# Patient Record
Sex: FEMALE | Race: WHITE | HISPANIC OR LATINO | URBAN - METROPOLITAN AREA
[De-identification: names, ages, dates, MRNs, and addresses within clinical notes are randomized per-mention and may not be internally consistent; named-entity substitution may affect disease eponyms.]

---

## 2017-05-09 ENCOUNTER — EMERGENCY (EMERGENCY)
Facility: HOSPITAL | Age: 52
LOS: 1 days | Discharge: HOME | End: 2017-05-09
Attending: EMERGENCY MEDICINE | Admitting: EMERGENCY MEDICINE
Payer: COMMERCIAL

## 2017-05-09 VITALS
OXYGEN SATURATION: 100 % | HEART RATE: 71 BPM | RESPIRATION RATE: 17 BRPM | DIASTOLIC BLOOD PRESSURE: 78 MMHG | TEMPERATURE: 98 F | SYSTOLIC BLOOD PRESSURE: 140 MMHG

## 2017-05-09 VITALS
OXYGEN SATURATION: 99 % | RESPIRATION RATE: 18 BRPM | DIASTOLIC BLOOD PRESSURE: 84 MMHG | HEART RATE: 74 BPM | SYSTOLIC BLOOD PRESSURE: 156 MMHG | TEMPERATURE: 98 F

## 2017-05-09 DIAGNOSIS — M25.562 PAIN IN LEFT KNEE: ICD-10-CM

## 2017-05-09 PROCEDURE — 73562 X-RAY EXAM OF KNEE 3: CPT

## 2017-05-09 PROCEDURE — 99283 EMERGENCY DEPT VISIT LOW MDM: CPT | Mod: 25

## 2017-05-09 PROCEDURE — 99283 EMERGENCY DEPT VISIT LOW MDM: CPT

## 2017-05-09 PROCEDURE — 73562 X-RAY EXAM OF KNEE 3: CPT | Mod: 26,LT

## 2017-05-09 NOTE — ED PROVIDER NOTE - PHYSICAL EXAMINATION
christiano - l knee med jt line ttp, small ant med effusion, no erythma from no ext flx lag, lachman neg ext mech intact

## 2017-05-09 NOTE — ED PROVIDER NOTE - OBJECTIVE STATEMENT
51 year old female w no PMHx reports that she was exercising in a [employer] class 1 week ago and has had anterior left knee pain since that time. She has been taking Motrin without improvement. She denies fall or injury to the left knee. She is able to ambulate, but she does have pain bearing weight on the left leg.

## 2017-05-09 NOTE — ED PROVIDER NOTE - CARE PLAN
Principal Discharge DX:	Acute pain of left knee Principal Discharge DX:	Acute pain of left knee  Instructions for follow-up, activity and diet:	Follow up with orthopedics in 1-2 days.  (See list attached)  Rest, Ice 3-4 x a day x 48hours, elevate knee  Keep ACE bandage in place  Take Motrin 600mg orally every 8 hours as needed for pain take with food.  Return to the ER for any persistent/worsening or new symptoms weakness, numbness or any concerning symptoms.

## 2017-05-09 NOTE — ED PROVIDER NOTE - PLAN OF CARE
Follow up with orthopedics in 1-2 days.  (See list attached)  Rest, Ice 3-4 x a day x 48hours, elevate knee  Keep ACE bandage in place  Take Motrin 600mg orally every 8 hours as needed for pain take with food.  Return to the ER for any persistent/worsening or new symptoms weakness, numbness or any concerning symptoms.

## 2017-05-09 NOTE — ED ADULT NURSE NOTE - OBJECTIVE STATEMENT
52 y/o female patient presents ambulatory to ED with complaint of left knee pain/injury. Patient states she was at [employer] 1 week ago which is when the pain began. Patient denies any injury and trauma besides the dance exercise. No obvious deformity, no bruising, no laceration or abrasion. Patient states difficulty with bearing weight on the left side. Per patient she took Motrin at home without relief. Patient denies SOB and CP, no N/V/D, afebrile in ED, no headache, no lightheadedness/dizziness, no weakness, no numbness or tingling, no abdominal pain or tenderness, no syncope, no cough, no URI.

## 2017-06-03 ENCOUNTER — EMERGENCY (EMERGENCY)
Facility: HOSPITAL | Age: 52
LOS: 1 days | Discharge: HOME | End: 2017-06-03
Attending: EMERGENCY MEDICINE | Admitting: EMERGENCY MEDICINE
Payer: COMMERCIAL

## 2017-06-03 VITALS
RESPIRATION RATE: 17 BRPM | SYSTOLIC BLOOD PRESSURE: 131 MMHG | OXYGEN SATURATION: 100 % | DIASTOLIC BLOOD PRESSURE: 67 MMHG | HEART RATE: 60 BPM

## 2017-06-03 VITALS
SYSTOLIC BLOOD PRESSURE: 184 MMHG | HEART RATE: 85 BPM | OXYGEN SATURATION: 98 % | RESPIRATION RATE: 18 BRPM | TEMPERATURE: 99 F | DIASTOLIC BLOOD PRESSURE: 79 MMHG

## 2017-06-03 DIAGNOSIS — M54.9 DORSALGIA, UNSPECIFIED: ICD-10-CM

## 2017-06-03 DIAGNOSIS — Z79.1 LONG TERM (CURRENT) USE OF NON-STEROIDAL ANTI-INFLAMMATORIES (NSAID): ICD-10-CM

## 2017-06-03 LAB
APPEARANCE UR: CLEAR — SIGNIFICANT CHANGE UP
BACTERIA # UR AUTO: ABNORMAL /HPF
BILIRUB UR-MCNC: NEGATIVE — SIGNIFICANT CHANGE UP
COLOR SPEC: SIGNIFICANT CHANGE UP
DIFF PNL FLD: ABNORMAL
GLUCOSE UR QL: NEGATIVE — SIGNIFICANT CHANGE UP
KETONES UR-MCNC: NEGATIVE — SIGNIFICANT CHANGE UP
LEUKOCYTE ESTERASE UR-ACNC: NEGATIVE — SIGNIFICANT CHANGE UP
NITRITE UR-MCNC: NEGATIVE — SIGNIFICANT CHANGE UP
PH UR: 7 — SIGNIFICANT CHANGE UP (ref 5–8)
PROT UR-MCNC: NEGATIVE — SIGNIFICANT CHANGE UP
RBC CASTS # UR COMP ASSIST: ABNORMAL /HPF (ref 0–2)
SP GR SPEC: 1.01 — LOW (ref 1.01–1.02)
UROBILINOGEN FLD QL: NEGATIVE — SIGNIFICANT CHANGE UP
WBC UR QL: SIGNIFICANT CHANGE UP /HPF (ref 0–5)

## 2017-06-03 PROCEDURE — 99283 EMERGENCY DEPT VISIT LOW MDM: CPT

## 2017-06-03 PROCEDURE — 81001 URINALYSIS AUTO W/SCOPE: CPT

## 2017-06-03 RX ORDER — OXYCODONE HYDROCHLORIDE 5 MG/1
5 TABLET ORAL ONCE
Qty: 0 | Refills: 0 | Status: DISCONTINUED | OUTPATIENT
Start: 2017-06-03 | End: 2017-06-03

## 2017-06-03 RX ORDER — OXYCODONE HYDROCHLORIDE 5 MG/1
2 TABLET ORAL
Qty: 24 | Refills: 0 | OUTPATIENT
Start: 2017-06-03 | End: 2017-06-06

## 2017-06-03 RX ORDER — IBUPROFEN 200 MG
0 TABLET ORAL
Qty: 0 | Refills: 0 | COMMUNITY

## 2017-06-03 RX ORDER — ACETAMINOPHEN 500 MG
975 TABLET ORAL ONCE
Qty: 0 | Refills: 0 | Status: COMPLETED | OUTPATIENT
Start: 2017-06-03 | End: 2017-06-03

## 2017-06-03 RX ADMIN — OXYCODONE HYDROCHLORIDE 5 MILLIGRAM(S): 5 TABLET ORAL at 10:29

## 2017-06-03 RX ADMIN — Medication 975 MILLIGRAM(S): at 10:29

## 2017-06-03 RX ADMIN — Medication 975 MILLIGRAM(S): at 10:54

## 2017-06-03 RX ADMIN — OXYCODONE HYDROCHLORIDE 5 MILLIGRAM(S): 5 TABLET ORAL at 10:58

## 2017-06-03 RX ADMIN — OXYCODONE HYDROCHLORIDE 5 MILLIGRAM(S): 5 TABLET ORAL at 11:29

## 2017-06-03 RX ADMIN — OXYCODONE HYDROCHLORIDE 5 MILLIGRAM(S): 5 TABLET ORAL at 10:54

## 2017-06-03 NOTE — ED ADULT NURSE NOTE - OBJECTIVE STATEMENT
52 y/o female patient presents to ED with complaint of sudden onset left lower back pain with radiation down the left leg. Patient states she was moving heavy boxes yesterday but pain started while standing up this morning. Patient has full ROM in all extremities, no bowel or bladder dysfunction, no saddle paresthesia, no skin rash. Patient took 4 Advil prior to arrival in ED without relief. 50 y/o female patient presents to ED with complaint of sudden onset left lower back pain with radiation down the left leg. Patient states she was moving heavy boxes yesterday but pain started while standing up this morning. Patient has full ROM in all extremities, no bowel or bladder dysfunction, no saddle paresthesia, no skin rash. Patient took 4 Advil prior to arrival in ED without relief. Pain worsens with movement, feels better when sitting up. Patient denies SOB and CP, no N/V/D, afebrile in ED, no headache, no lightheadedness/dizziness, no weakness, no numbness or tingling, no abdominal pain or tenderness. Patient states she got her period yesterday.

## 2017-06-03 NOTE — ED PROVIDER NOTE - PHYSICAL EXAMINATION
(GENERAL) This is a well developed, well nourished patient who is in no apparent distress.  (HEENT) There is no signs of trauma of the head, neck, chest, or belly. The head is normocephalic.  The outer ears appears normal. (EYES) The sclera is anicteric, conjunctiva is pink, mucous membranes are moist.  (Respiratory)There is no stridor and the patient is moving air throughout the respiratory tract well.  There are no visible masses of the neck.  (CARDIAC) The rate appears to be normal. There is no JVD. (SKIN) The skin is warm and dry. (MUSCULOSKELETAL) The extremities are warm to the touch with good capillary refill and without edema. No straight leg raise pain, mild pain on palpation of back, no bony tenderness. Upon my exam, the extremity is neurovascular intact with good capillary refill. No signs of trauma (GI) Abdomen is soft and nontender.  (PSYCH) The patient is cooperative and pleasant.  (NEURO) There are no obvious focal deformities on neurologic examination. Anxious, tearful, texting phone during my exam and interview.

## 2017-06-03 NOTE — ED ADULT NURSE REASSESSMENT NOTE - NS ED NURSE REASSESS COMMENT FT1
Patient tolerated PO, ambulated with steady gait. Patient offered pain medication (oxycodone 5mg) refused medication. MD Zelaya aware, patient to be discharged home.

## 2017-06-03 NOTE — ED ADULT NURSE REASSESSMENT NOTE - NS ED NURSE REASSESS COMMENT FT1
Patient ambulated to the bathroom, steady gait without difficulty. Pain still present but controlled.

## 2017-06-05 ENCOUNTER — APPOINTMENT (OUTPATIENT)
Dept: ORTHOPEDIC SURGERY | Facility: CLINIC | Age: 52
End: 2017-06-05

## 2017-06-05 VITALS
SYSTOLIC BLOOD PRESSURE: 148 MMHG | HEIGHT: 64 IN | WEIGHT: 140 LBS | DIASTOLIC BLOOD PRESSURE: 88 MMHG | HEART RATE: 71 BPM | BODY MASS INDEX: 23.9 KG/M2

## 2017-06-05 DIAGNOSIS — M47.817 SPONDYLOSIS W/OUT MYELOPATHY OR RADICULOPATHY, LUMBOSACRAL REGION: ICD-10-CM

## 2017-06-05 DIAGNOSIS — Z78.9 OTHER SPECIFIED HEALTH STATUS: ICD-10-CM

## 2017-06-05 DIAGNOSIS — Z87.39 PERSONAL HISTORY OF OTHER DISEASES OF THE MUSCULOSKELETAL SYSTEM AND CONNECTIVE TISSUE: ICD-10-CM

## 2017-06-05 PROBLEM — Z00.00 ENCOUNTER FOR PREVENTIVE HEALTH EXAMINATION: Status: ACTIVE | Noted: 2017-06-05

## 2017-06-05 RX ORDER — CYCLOBENZAPRINE HYDROCHLORIDE 10 MG/1
10 TABLET, FILM COATED ORAL
Qty: 60 | Refills: 0 | Status: ACTIVE | COMMUNITY
Start: 2017-06-05 | End: 1900-01-01

## 2017-10-03 ENCOUNTER — RESULT REVIEW (OUTPATIENT)
Age: 52
End: 2017-10-03

## 2018-07-26 PROBLEM — M47.817 LUMBOSACRAL SPONDYLOSIS: Status: ACTIVE | Noted: 2017-06-05

## 2019-04-12 ENCOUNTER — EMERGENCY (EMERGENCY)
Facility: HOSPITAL | Age: 54
LOS: 1 days | Discharge: HOME | End: 2019-04-12
Attending: EMERGENCY MEDICINE
Payer: COMMERCIAL

## 2019-04-12 VITALS
HEART RATE: 77 BPM | WEIGHT: 145.06 LBS | HEIGHT: 64 IN | TEMPERATURE: 98 F | SYSTOLIC BLOOD PRESSURE: 121 MMHG | OXYGEN SATURATION: 98 % | DIASTOLIC BLOOD PRESSURE: 79 MMHG | RESPIRATION RATE: 17 BRPM

## 2019-04-12 PROCEDURE — 99283 EMERGENCY DEPT VISIT LOW MDM: CPT

## 2019-04-12 RX ORDER — OFLOXACIN 0.3 %
1 DROPS OPHTHALMIC (EYE) ONCE
Qty: 0 | Refills: 0 | Status: COMPLETED | OUTPATIENT
Start: 2019-04-12 | End: 2019-04-12

## 2019-04-12 RX ORDER — IBUPROFEN 200 MG
600 TABLET ORAL ONCE
Qty: 0 | Refills: 0 | Status: COMPLETED | OUTPATIENT
Start: 2019-04-12 | End: 2019-04-12

## 2019-04-13 PROBLEM — M54.9 DORSALGIA, UNSPECIFIED: Chronic | Status: ACTIVE | Noted: 2017-06-03

## 2019-04-13 PROBLEM — M50.20 OTHER CERVICAL DISC DISPLACEMENT, UNSPECIFIED CERVICAL REGION: Chronic | Status: ACTIVE | Noted: 2017-06-03

## 2019-04-13 RX ADMIN — Medication 1 DROP(S): at 00:13

## 2019-04-13 NOTE — ED ADULT NURSE NOTE - NSIMPLEMENTINTERV_GEN_ALL_ED
Implemented All Universal Safety Interventions:  [place] to call system. Call bell, personal items and telephone within reach. Instruct patient to call for assistance. Room bathroom lighting operational. Non-slip footwear when patient is off stretcher. Physically safe environment: no spills, clutter or unnecessary equipment. Stretcher in lowest position, wheels locked, appropriate side rails in place.

## 2019-04-13 NOTE — ED PROVIDER NOTE - ATTENDING CONTRIBUTION TO CARE
53F, presents with R eye irritation, discharge, erythema x 1 day. 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 No contact lens use. No vision changes. No photosensitivity. No pain with EOM. No f/c, headache, dizziness, n/v/d, or any other complaints.    PE: NAD, NCAT, MMM, Trachea midline, EOMI, +R eye conjunctival injeciton with yellowish discharge, no abnormality upon on eversion of lids, no proptosis, lungs CTAB, S1/S2 RRR, Normal perfusion, 2+ radial pulses bilat, Abdomen Soft, NTND, No rebound/guarding.    Exam and hx c/w R eye conjunctivitis. Acuity intact. To give abx drops, have f/u with ophthalmology. Return precautions discussed. - Roni Young MD

## 2019-04-13 NOTE — ED PROVIDER NOTE - OBJECTIVE STATEMENT
54yo female pt, ambulatory c/o I have conjunctivitis. Pt felt right eye irritation with sticky discharge for 1day. 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 Denies blurry vision or wearing contact lenses. Denies fever, chills or cough/ congestion. Denies sesnory changes or weakness to extremities. Denies CP/SOB/ABD pain or N/V/D.

## 2019-04-13 NOTE — ED PROVIDER NOTE - NSFOLLOWUPINSTRUCTIONS_ED_ALL_ED_FT
Do not rub your eyes.  Motrin (Advil or Ibuprofen) 600mg every 8hours for pain with food.  Ofloxacin eye drop, 1 drop, to right eye, every 6hours.  Follow up with your eye dr. or clinic [phone], call Monday for an appointment in 2days.  Return for any concerns or worsening symptoms.

## 2019-04-13 NOTE — ED PROVIDER NOTE - NSFOLLOWUPCLINICS_GEN_ALL_ED_FT
[facility]  Ophthalmology  [address] [place] [address] [address]  [place] [address]  Phone: [phone]  Fax:   Follow Up Time:

## 2019-05-19 NOTE — ED PROVIDER NOTE - NS ED ROS FT
Denies fever, trauma, nausea, vomiting, and diarrhea, urinary difficulties, dysuria, pregnancy Declined

## 2019-06-20 ENCOUNTER — RESULT REVIEW (OUTPATIENT)
Age: 54
End: 2019-06-20

## 2020-04-17 NOTE — ED ADULT TRIAGE NOTE - NS ED NOTE AC HIGH RISK COUNTRIES
No
[TextBox_4] : Some discomfort posterior right upper subscapular area.\par Otherwise symptom complex without change.

## 2020-06-22 ENCOUNTER — RESULT REVIEW (OUTPATIENT)
Age: 55
End: 2020-06-22

## 2021-06-29 ENCOUNTER — RESULT REVIEW (OUTPATIENT)
Age: 56
End: 2021-06-29

## 2022-01-29 NOTE — ED ADULT NURSE NOTE - CINV DISCH MEDS REVIEWED YN
Clinic GYN visit     Subjective: Pam Winslow presents today for evaluation of vaginal bleeding. S/p RALTH+BS on 21. Seen  for VB from  (3-4 pads/day) - thought to be due to raw granulation tissue. Silver nitrate applied. She stopped bleeding at that point. Had some discharge since.     Called on-call last night - yesterday again felt a gush and soaked through her underwear. Had dime-sized clot in menstrual pad. ~50% saturated in a few hours. +Dizzy, which is new for her. No fevers/chills. Some mild cramping (more than previously but overall mild and not as bad as shortly postop). +Urinary frequency. No change in BM. No N/V. No provoking event, no tampon use or intercourse.    Since her phonecall yesterday, bleeding slowed down to spotting. Has some right-sided cramping - has always been that way. Dizziness unchanged. No new sx.     Objective:  Visit Vitals  BP 92/59 (BP Location: RUE - Right upper extremity, Patient Position: Standing, Cuff Size: Regular)   Pulse 82   Temp 98.8 °F (37.1 °C) (Temporal)   Resp 16   Wt 75 kg   LMP 2021 (Exact Date)   SpO2 97%   BMI 33.40 kg/m²   Sittin/59  Supine: 91/60    General:  alert, in NAD  HEENT:  EOMI, no icterus  Lungs:   breathing comfortably on RA  Cardiac:   RRR, no pedal edema  Abdomen:  soft, non-distended, well-healed laparoscopic incisions, tender to palpation in RLQ, no rebound or guarding  Pelvic:  Normal appearing external genitalia w/o visible lesions. Scant blood in vault. Cuff appears to be intact and palpates intact. Vaginal mucosa pink and well-rugated. Uterus and cervix surgically absent. No masses identified on bimanual exam. TTP in right adnexa (with vaginal hand) and suprapubically (with abdominal hand). Declined chaperone.     Assessment/Plan: Pam Winslow is a 39 year old  here for intermittent VB s/p RALTH+BS on 21.   Indication: pelvic pain, menorrhagia.   Pathology: adenomyosis, endocervicitis.   Op note  reviewed: findings - \"Enlarged, globular uterus; omental adhesion to the superior abdominal wall which was clear of operative field; left ovary with 2x3cm firm cyst consistent with a fibroma; right ovary appeared normal; normal-appearing bilateral fallopian tubes; normal bowel.\" Cuff closed with 0 Vicryl V-Loc.    Hemodynamically stable. Hb 13.6 today (up from 12.8 preop). Vitals WNL, neg orthostatics.       Unclear source of bleeding based on hx and exam. Ddx includes continued bleeding from granulation tissue (previously cauterized 1/21) vs bleeding due to suture breakdown vs hematoma above vaginal cuff. Monsel's applied to cuff today. U/S ordered to evaluate - unable to do today (Sat) as pt did not want to go to [facility]; scheduled for Monday 2/1.     Also unclear etiology for RLQ pain. Benign exam. No apparent etiology in op note such as extensive dissection or adhesions, though did have omental adhesion to superior abd wall. May be related to postop healing.    Follow up for U/S on Monday. Will update pt's surgeon Dr Quiroz.  We discussed indications for eval via ED - worsening dizziness, soaking a pad in an hour, worsening pain, fevers, etc.     Vannesa Kruse MD   Yes

## 2022-08-31 ENCOUNTER — RESULT REVIEW (OUTPATIENT)
Age: 57
End: 2022-08-31

## 2024-12-24 ENCOUNTER — EMERGENCY (EMERGENCY)
Facility: HOSPITAL | Age: 59
LOS: 1 days | Discharge: HOME | End: 2024-12-24
Attending: EMERGENCY MEDICINE
Payer: COMMERCIAL

## 2024-12-24 VITALS
RESPIRATION RATE: 18 BRPM | SYSTOLIC BLOOD PRESSURE: 167 MMHG | WEIGHT: 149.91 LBS | HEART RATE: 75 BPM | TEMPERATURE: 98 F | HEIGHT: 64 IN | OXYGEN SATURATION: 99 % | DIASTOLIC BLOOD PRESSURE: 79 MMHG

## 2024-12-24 LAB
ADD ON TEST-SPECIMEN IN LAB: SIGNIFICANT CHANGE UP
ALBUMIN SERPL ELPH-MCNC: 4.8 G/DL — SIGNIFICANT CHANGE UP (ref 3.3–5)
ALP SERPL-CCNC: 112 U/L — SIGNIFICANT CHANGE UP (ref 40–120)
ALT FLD-CCNC: 28 U/L — SIGNIFICANT CHANGE UP (ref 10–45)
ANION GAP SERPL CALC-SCNC: 15 MMOL/L — SIGNIFICANT CHANGE UP (ref 5–17)
AST SERPL-CCNC: 20 U/L — SIGNIFICANT CHANGE UP (ref 10–40)
BASOPHILS # BLD AUTO: 0.01 K/UL — SIGNIFICANT CHANGE UP (ref 0–0.2)
BASOPHILS NFR BLD AUTO: 0.1 % — SIGNIFICANT CHANGE UP (ref 0–2)
BILIRUB SERPL-MCNC: 0.5 MG/DL — SIGNIFICANT CHANGE UP (ref 0.2–1.2)
BUN SERPL-MCNC: 11 MG/DL — SIGNIFICANT CHANGE UP (ref 7–23)
CALCIUM SERPL-MCNC: 10 MG/DL — SIGNIFICANT CHANGE UP (ref 8.4–10.5)
CHLORIDE SERPL-SCNC: 99 MMOL/L — SIGNIFICANT CHANGE UP (ref 96–108)
CO2 SERPL-SCNC: 23 MMOL/L — SIGNIFICANT CHANGE UP (ref 22–31)
CREAT SERPL-MCNC: 0.56 MG/DL — SIGNIFICANT CHANGE UP (ref 0.5–1.3)
EGFR: 104 ML/MIN/1.73M2 — SIGNIFICANT CHANGE UP
EOSINOPHIL # BLD AUTO: 0.01 K/UL — SIGNIFICANT CHANGE UP (ref 0–0.5)
EOSINOPHIL NFR BLD AUTO: 0.1 % — SIGNIFICANT CHANGE UP (ref 0–6)
GLUCOSE SERPL-MCNC: 149 MG/DL — HIGH (ref 70–99)
HCT VFR BLD CALC: 40.7 % — SIGNIFICANT CHANGE UP (ref 34.5–45)
HGB BLD-MCNC: 13.6 G/DL — SIGNIFICANT CHANGE UP (ref 11.5–15.5)
IMM GRANULOCYTES NFR BLD AUTO: 0.4 % — SIGNIFICANT CHANGE UP (ref 0–0.9)
LIDOCAIN IGE QN: 48 U/L — SIGNIFICANT CHANGE UP (ref 7–60)
LYMPHOCYTES # BLD AUTO: 1.45 K/UL — SIGNIFICANT CHANGE UP (ref 1–3.3)
LYMPHOCYTES # BLD AUTO: 12.9 % — LOW (ref 13–44)
MAGNESIUM SERPL-MCNC: 2.1 MG/DL — SIGNIFICANT CHANGE UP (ref 1.6–2.6)
MCHC RBC-ENTMCNC: 31.2 PG — SIGNIFICANT CHANGE UP (ref 27–34)
MCHC RBC-ENTMCNC: 33.4 G/DL — SIGNIFICANT CHANGE UP (ref 32–36)
MCV RBC AUTO: 93.3 FL — SIGNIFICANT CHANGE UP (ref 80–100)
MONOCYTES # BLD AUTO: 0.88 K/UL — SIGNIFICANT CHANGE UP (ref 0–0.9)
MONOCYTES NFR BLD AUTO: 7.8 % — SIGNIFICANT CHANGE UP (ref 2–14)
NEUTROPHILS # BLD AUTO: 8.88 K/UL — HIGH (ref 1.8–7.4)
NEUTROPHILS NFR BLD AUTO: 78.7 % — HIGH (ref 43–77)
NRBC # BLD: 0 /100 WBCS — SIGNIFICANT CHANGE UP (ref 0–0)
PHOSPHATE SERPL-MCNC: 2.9 MG/DL — SIGNIFICANT CHANGE UP (ref 2.5–4.5)
PLATELET # BLD AUTO: 286 K/UL — SIGNIFICANT CHANGE UP (ref 150–400)
POTASSIUM SERPL-MCNC: 4.1 MMOL/L — SIGNIFICANT CHANGE UP (ref 3.5–5.3)
POTASSIUM SERPL-SCNC: 4.1 MMOL/L — SIGNIFICANT CHANGE UP (ref 3.5–5.3)
PROT SERPL-MCNC: 8 G/DL — SIGNIFICANT CHANGE UP (ref 6–8.3)
RBC # BLD: 4.36 M/UL — SIGNIFICANT CHANGE UP (ref 3.8–5.2)
RBC # FLD: 12.7 % — SIGNIFICANT CHANGE UP (ref 10.3–14.5)
SODIUM SERPL-SCNC: 137 MMOL/L — SIGNIFICANT CHANGE UP (ref 135–145)
WBC # BLD: 11.27 K/UL — HIGH (ref 3.8–10.5)
WBC # FLD AUTO: 11.27 K/UL — HIGH (ref 3.8–10.5)

## 2024-12-24 PROCEDURE — 83735 ASSAY OF MAGNESIUM: CPT

## 2024-12-24 PROCEDURE — 74177 CT ABD & PELVIS W/CONTRAST: CPT | Mod: 26,MC

## 2024-12-24 PROCEDURE — 84132 ASSAY OF SERUM POTASSIUM: CPT

## 2024-12-24 PROCEDURE — 99284 EMERGENCY DEPT VISIT MOD MDM: CPT | Mod: 25

## 2024-12-24 PROCEDURE — 80053 COMPREHEN METABOLIC PANEL: CPT

## 2024-12-24 PROCEDURE — 96375 TX/PRO/DX INJ NEW DRUG ADDON: CPT

## 2024-12-24 PROCEDURE — 85025 COMPLETE CBC W/AUTO DIFF WBC: CPT

## 2024-12-24 PROCEDURE — 82803 BLOOD GASES ANY COMBINATION: CPT

## 2024-12-24 PROCEDURE — 96376 TX/PRO/DX INJ SAME DRUG ADON: CPT

## 2024-12-24 PROCEDURE — 99285 EMERGENCY DEPT VISIT HI MDM: CPT

## 2024-12-24 PROCEDURE — 83690 ASSAY OF LIPASE: CPT

## 2024-12-24 PROCEDURE — 85014 HEMATOCRIT: CPT

## 2024-12-24 PROCEDURE — 82435 ASSAY OF BLOOD CHLORIDE: CPT

## 2024-12-24 PROCEDURE — 74177 CT ABD & PELVIS W/CONTRAST: CPT | Mod: MC

## 2024-12-24 PROCEDURE — 82330 ASSAY OF CALCIUM: CPT

## 2024-12-24 PROCEDURE — 96374 THER/PROPH/DIAG INJ IV PUSH: CPT | Mod: XU

## 2024-12-24 PROCEDURE — 82947 ASSAY GLUCOSE BLOOD QUANT: CPT

## 2024-12-24 PROCEDURE — 83605 ASSAY OF LACTIC ACID: CPT

## 2024-12-24 PROCEDURE — 85018 HEMOGLOBIN: CPT

## 2024-12-24 PROCEDURE — 84295 ASSAY OF SERUM SODIUM: CPT

## 2024-12-24 PROCEDURE — 84100 ASSAY OF PHOSPHORUS: CPT

## 2024-12-24 PROCEDURE — 36415 COLL VENOUS BLD VENIPUNCTURE: CPT

## 2024-12-24 RX ORDER — ONDANSETRON HYDROCHLORIDE 4 MG/1
4 TABLET, FILM COATED ORAL ONCE
Refills: 0 | Status: COMPLETED | OUTPATIENT
Start: 2024-12-24 | End: 2024-12-24

## 2024-12-24 RX ORDER — ACETAMINOPHEN 500MG 500 MG/1
1000 TABLET, COATED ORAL ONCE
Refills: 0 | Status: COMPLETED | OUTPATIENT
Start: 2024-12-24 | End: 2024-12-24

## 2024-12-24 RX ORDER — SODIUM CHLORIDE 9 MG/ML
1000 INJECTION, SOLUTION INTRAMUSCULAR; INTRAVENOUS; SUBCUTANEOUS ONCE
Refills: 0 | Status: COMPLETED | OUTPATIENT
Start: 2024-12-24 | End: 2024-12-24

## 2024-12-24 RX ADMIN — ACETAMINOPHEN 500MG 400 MILLIGRAM(S): 500 TABLET, COATED ORAL at 13:20

## 2024-12-24 RX ADMIN — ACETAMINOPHEN 500MG 400 MILLIGRAM(S): 500 TABLET, COATED ORAL at 22:03

## 2024-12-24 RX ADMIN — SODIUM CHLORIDE 1000 MILLILITER(S): 9 INJECTION, SOLUTION INTRAMUSCULAR; INTRAVENOUS; SUBCUTANEOUS at 13:22

## 2024-12-24 RX ADMIN — ONDANSETRON HYDROCHLORIDE 4 MILLIGRAM(S): 4 TABLET, FILM COATED ORAL at 13:20

## 2024-12-24 RX ADMIN — ACETAMINOPHEN 500MG 1000 MILLIGRAM(S): 500 TABLET, COATED ORAL at 19:03

## 2024-12-24 NOTE — ED ADULT TRIAGE NOTE - MEANS OF ARRIVAL
Surgery put on hold as he did not have a pre op appointment. Daughter Fela will reach out to PCP to schedule and call us back to reschedule surgery.    ambulatory

## 2024-12-24 NOTE — ED PROVIDER NOTE - NSFOLLOWUPINSTRUCTIONS_ED_ALL_ED_FT
Thank you for coming to the Emergency Department.    You were seen today for diarrhea and abdominal pain. We obtained lab work and imaging to help determine what was causing your symptoms.     Your CT demonstrates CT demonstrates infectious vs inflammatory colitis. We likely think your colitis is due to an infectious process meaning you have a stomach bug.    Based on our examination we have determined that there is no immediate danger to your health at this time.    We would like you to follow up with your primary care doctor and GI doctor within 1 week.     We will have our discharge center call you within 1-3 days to help you coordinate an appointment with a GI doctor.     PLEASE RETURN TO THE EMERGENCY DEPARTMENT and call 911 IF YOU EXPERIENCE ANY OF THE FOLLOWING SYMPTOMS: fever, worsening bleeding, palpitations, shortness of breath, light headedness or any other concerning symptoms.

## 2024-12-24 NOTE — ED ADULT NURSE NOTE - OBJECTIVE STATEMENT
59F aaox4 ambulatory came as walk in from home with c/o abdominal pain and diarrhea. 59F aaox4 ambulatory came as walk in from home with c/o abdominal pain and diarrhea started yesterday after eating fish. patient denies anyone with same complain at home. Patient also reports she's having blood in stool secondary to her hemorrhoids with the multiple diarrheas. Patient also c/o vomiting.   Left AC 18g IV access inserted, labs sent pending results.

## 2024-12-24 NOTE — ED ADULT NURSE NOTE - NSFALLHARMRISKINTERV_ED_ALL_ED

## 2024-12-24 NOTE — ED PROVIDER NOTE - OBJECTIVE STATEMENT
59-year-old female past medical history of GERD presents to ED with vomiting and diarrhea started 2 days.  Reports that she has been having this persistent abdominal pain in the left > right lower quadrant.  Patient reports that she has history of hemorrhoids but recently she has been having diarrhea with clots of blood.  Denies any fever, chest pain, shortness of breath, urinary symptoms.

## 2024-12-24 NOTE — ED PROVIDER NOTE - PATIENT PORTAL LINK FT
You can access the FollowMyHealth Patient Portal offered by [facility] by registering at the following website: [email or web address]. By joining [facility]’s FollowMyHealth portal, you will also be able to view your health information using other applications (apps) compatible with our system.

## 2024-12-24 NOTE — ED ADULT TRIAGE NOTE - AS TEMP SITE
Medication(s) to Refill:   Requested Prescriptions     Pending Prescriptions Disp Refills   • Omeprazole 40 MG Oral Capsule Delayed Release 90 capsule 0     Sig: Take 1 capsule (40 mg total) by mouth once daily.    • atorvastatin 20 MG Oral Tab 90 tablet 0 oral

## 2024-12-24 NOTE — ED PROVIDER NOTE - PHYSICAL EXAMINATION
Kyra Patel DO (PGY1)   Physical Exam:    Gen: NAD, AOx3  Head: NCAT  HEENT: EOMI, PEERLA  Lung: CTAB, no respiratory distress, no wheezes/rhonchi/rales B/L  CV: RRR, no murmurs, rubs or gallops  Abd: soft, NT, ND, no guarding, no rigidity, no rebound tenderness, mild tenderness on the LLQ  MSK: no visible deformities, ROM normal in UE/LE, no back pain  Neuro: No focal sensory or motor deficits. Sensation intact to light touch all extremities.  Skin: Warm, well perfused, no rash, no leg swelling  Psych: normal affect, calm Kyra Patel DO (PGY1)   Physical Exam:    Gen: NAD, AOx3  Head: NCAT  HEENT: EOMI, PEERLA  Lung: CTAB, no respiratory distress, no wheezes/rhonchi/rales B/L  CV: RRR, no murmurs, rubs or gallops  Abd: soft, NT, ND, no guarding, no rigidity, no rebound tenderness, mild tenderness on the LLQ  MSK: no visible deformities, ROM normal in UE/LE, no back pain  Neuro: No focal sensory or motor deficits. Sensation intact to light touch all extremities.  Skin: Warm, well perfused, no rash, no leg swelling  Psych: normal affect, calm  Rectal: Chaperoned by STEFF Ulloa- External hemorrhoid, non thrombosed, blood noted externally, bright red blood noted with digital exam

## 2024-12-24 NOTE — ED PROVIDER NOTE - CLINICAL SUMMARY MEDICAL DECISION MAKING FREE TEXT BOX
59-year-old female past medical history GERD presents to ED with diarrhea, abdominal pain for the past 2 days.  Reports having blood in the stool. Hx of hemorrhoids.    Differential includes viral gastroenteritis vs hemorrhoids vs infectious colitis vs IBD. Will obtain CT abdomen and obtain blood work including lactate. Will rehydrate and reassess.

## 2024-12-24 NOTE — ED PROVIDER NOTE - PROGRESS NOTE DETAILS
CT demonstrates infectious vs inflammatory colitis. No indication for antibiotics at this time. Will advise follow up with GI to evaluate for potential inflam colitis given hematochezia. Pt is afebrile currently, vitals are stable. Pt was educated on outpatient treatment, follow up and return precautions. Shared decision making performed. Pt okay for DC home at this time. Questions answered. Pt understands and agrees with the plan for discharge home. Pt has access to appropriate follow up.   Dora Yeboah PGY1

## 2024-12-24 NOTE — ED ADULT NURSE REASSESSMENT NOTE - NS ED NURSE REASSESS COMMENT FT1
Patient verbalized improvement after fluids and medication, labs resulted, patient pending abd CT and rectal examination.

## 2024-12-24 NOTE — ED PROVIDER NOTE - ATTENDING CONTRIBUTION TO CARE
Chief Complaint:    - Vomiting, diarrhea, and abdominal pain for past two days     HPI:    - 59-year-old female, Ping Davila, with a known medical history of gastroesophageal reflux disease (GERD) and hemorrhoids, presented to the emergency department with a two-day history of vomiting and diarrhea. The vomiting was non-bloody and non-bilious, and diarrhea posed serious discomfort, causing the patient to visit the bathroom all night. She experienced left and right lower quadrant abdominal pain, and noted blood clots in her stool. The patient wasn't sure whether the bloody stool was connected to her known hemorrhoids or is a new symptom. She hasn't attempted any treatment for these symptoms yet. She reports no additional symptoms including fever, chills, chest pain, shortness of breath, or urinary symptoms.     Past Medical History:    -  History of gastroesophageal reflux disease (GERD)    -  Prior diagnosis of hemorrhoids     Family History:    - Not discussed     Social History:    - No travel history, no recent antibiotics. Otherwise, generally healthy lifestyle     Review of Systems:    -  No respiratory symptoms    -  Gastrointestinal: positive for vomiting, diarrhea, abdominal pain, and presence of blood in stool    -  No signs of infection such as fever or chills    -  No cardiovascular symptoms, such as chest pain or palpitations    -  No urinary symptoms presented     Physical Examination:    -  Patient presented with mild tenderness in the lower quadrants, notably the left lower quadrant. No discomfort or abnormalities noted on the respiratory, cardiovascular, nor genitourinary evaluations. Rectal exam pending to visualize hemorrhoids     Labs and Studies:    -  Request for CT scan initiated to assess possible diverticulitis.     Medical Decision Making:    -  The chief concern is directed towards the patient’s abdominal pain, vomiting, and diarrhea. Differential diagnosis to be considered is diverticulitis given the recent presentation of bleeding. However, the symptoms are also consistent with viral gastroenteritis, and infectious colitis may also be a possibility. The plan is to perform a CT scan and await rectal examination to evaluate for potential source of the blood in the stool. The patient has no recent history of travel, which makes common travel-related infectious diseases unlikely. She also denied recent antibiotic use, which would rule out C. difficile infection.     Impression:    -  Considering patient's history and symptoms, possible diagnoses include diverticulitis, gastroenteritis or infectious colitis.    Ryan Valdez MD FACEP note of transfer at the usual time of sign out: Receiving team, Dr. Jahaira, will follow up on labs, analgesia, any clinical imaging, reassess and disposition as clinically indicated.  Details of patient and plan conveyed to receiving physician and conveyed back for understanding.  There were no questions at this time about the patient's status, disposition, and plan. Patient's care to be taken over by receiving physician at this time, all decisions regarding the progression of care will be made at their discretion.

## 2024-12-25 VITALS
DIASTOLIC BLOOD PRESSURE: 88 MMHG | OXYGEN SATURATION: 98 % | SYSTOLIC BLOOD PRESSURE: 145 MMHG | HEART RATE: 68 BPM | RESPIRATION RATE: 16 BRPM | TEMPERATURE: 98 F

## 2025-07-16 ENCOUNTER — RESULT REVIEW (OUTPATIENT)
Age: 60
End: 2025-07-16